# Patient Record
Sex: FEMALE | Race: WHITE | Employment: FULL TIME | URBAN - NONMETROPOLITAN AREA
[De-identification: names, ages, dates, MRNs, and addresses within clinical notes are randomized per-mention and may not be internally consistent; named-entity substitution may affect disease eponyms.]

---

## 2020-01-30 ENCOUNTER — APPOINTMENT (OUTPATIENT)
Dept: GENERAL RADIOLOGY | Age: 55
End: 2020-01-30
Payer: COMMERCIAL

## 2020-01-30 ENCOUNTER — HOSPITAL ENCOUNTER (EMERGENCY)
Age: 55
Discharge: HOME | End: 2020-01-30
Attending: EMERGENCY MEDICINE
Payer: COMMERCIAL

## 2020-01-30 VITALS
RESPIRATION RATE: 18 BRPM | SYSTOLIC BLOOD PRESSURE: 121 MMHG | OXYGEN SATURATION: 96 % | DIASTOLIC BLOOD PRESSURE: 80 MMHG | TEMPERATURE: 98.7 F | HEART RATE: 81 BPM | BODY MASS INDEX: 28.51 KG/M2 | HEIGHT: 61 IN | WEIGHT: 151 LBS

## 2020-01-30 LAB
BILIRUBIN URINE: NEGATIVE
COLOR: YELLOW
COMMENT UA: NORMAL
DIRECT EXAM: NORMAL
DIRECT EXAM: NORMAL
GLUCOSE URINE: NEGATIVE
KETONES, URINE: NEGATIVE
LEUKOCYTE ESTERASE, URINE: NEGATIVE
Lab: NORMAL
NITRITE, URINE: NEGATIVE
PH UA: 6 (ref 5–8)
PROTEIN UA: NEGATIVE
SPECIFIC GRAVITY UA: 1.01 (ref 1–1.03)
SPECIMEN DESCRIPTION: NORMAL
TURBIDITY: CLEAR
URINE HGB: NEGATIVE
UROBILINOGEN, URINE: NORMAL

## 2020-01-30 PROCEDURE — 99283 EMERGENCY DEPT VISIT LOW MDM: CPT

## 2020-01-30 PROCEDURE — 87804 INFLUENZA ASSAY W/OPTIC: CPT

## 2020-01-30 PROCEDURE — 81003 URINALYSIS AUTO W/O SCOPE: CPT

## 2020-01-30 PROCEDURE — 71046 X-RAY EXAM CHEST 2 VIEWS: CPT

## 2020-01-30 RX ORDER — METOPROLOL TARTRATE 100 MG/1
100 TABLET ORAL 3 TIMES DAILY
COMMUNITY
Start: 2019-07-17

## 2020-01-30 RX ORDER — TOCOPHERSOLAN (VITAMIN E TPGS) 400/15ML
1 LIQUID (ML) ORAL DAILY
COMMUNITY

## 2020-01-30 RX ORDER — TRAMADOL HYDROCHLORIDE 50 MG/1
50 TABLET ORAL EVERY 6 HOURS PRN
COMMUNITY
Start: 2018-10-22 | End: 2021-01-04

## 2020-01-30 RX ORDER — ERGOCALCIFEROL 1.25 MG/1
50000 CAPSULE ORAL WEEKLY
COMMUNITY
Start: 2019-11-22

## 2020-01-30 RX ORDER — MILK THISTLE 150 MG
150 CAPSULE ORAL 2 TIMES DAILY
COMMUNITY

## 2020-01-30 RX ORDER — ZOLPIDEM TARTRATE 10 MG/1
10 TABLET ORAL NIGHTLY
COMMUNITY
Start: 2018-10-22 | End: 2020-09-27

## 2020-01-30 RX ORDER — LOSARTAN POTASSIUM 100 MG/1
50 TABLET ORAL 2 TIMES DAILY
COMMUNITY
Start: 2019-11-22

## 2020-01-30 RX ORDER — LEVOFLOXACIN 500 MG/1
500 TABLET, FILM COATED ORAL DAILY
Qty: 7 TABLET | Refills: 0 | Status: SHIPPED | OUTPATIENT
Start: 2020-01-30 | End: 2020-02-06

## 2020-01-30 RX ORDER — CYANOCOBALAMIN (VITAMIN B-12) 1000 MCG
1500 TABLET, SUBLINGUAL SUBLINGUAL DAILY
COMMUNITY

## 2020-01-30 NOTE — LETTER
[facility] ED  [address], [address]  Phone: [phone]               January 30, 2020    Patient: Gerardo Maynard   YOB: 1965   Date of Visit: 1/30/2020       To Whom It May Concern:    Rosemarie Siu was seen and treated in our emergency department on 1/30/2020. She may return to work on 2/1/2020.       Sincerely,       Pj Evans RN         Signature:__________________________________

## 2020-10-21 ENCOUNTER — HOSPITAL ENCOUNTER (OUTPATIENT)
Dept: MRI IMAGING | Age: 55
Discharge: HOME | End: 2020-10-23
Payer: COMMERCIAL

## 2020-10-21 ENCOUNTER — HOSPITAL ENCOUNTER (OUTPATIENT)
Age: 55
Discharge: HOME | End: 2020-10-21
Payer: COMMERCIAL

## 2020-10-21 DIAGNOSIS — R56.9 SEIZURES (HCC): Primary | ICD-10-CM

## 2020-10-21 LAB
BUN BLDV-MCNC: 8 MG/DL (ref 6–20)
CREAT SERPL-MCNC: 0.74 MG/DL (ref 0.5–0.9)
GFR AFRICAN AMERICAN: >60 ML/MIN
GFR NON-AFRICAN AMERICAN: >60 ML/MIN
GFR SERPL CREATININE-BSD FRML MDRD: NORMAL ML/MIN/{1.73_M2}
GFR SERPL CREATININE-BSD FRML MDRD: NORMAL ML/MIN/{1.73_M2}

## 2020-10-21 PROCEDURE — 82565 ASSAY OF CREATININE: CPT

## 2020-10-21 PROCEDURE — 84520 ASSAY OF UREA NITROGEN: CPT

## 2020-10-21 PROCEDURE — A9577 INJ MULTIHANCE: HCPCS | Performed by: PSYCHIATRY & NEUROLOGY

## 2020-10-21 PROCEDURE — 70553 MRI BRAIN STEM W/O & W/DYE: CPT

## 2020-10-21 PROCEDURE — 36415 COLL VENOUS BLD VENIPUNCTURE: CPT

## 2020-10-21 PROCEDURE — 6360000004 HC RX CONTRAST MEDICATION: Performed by: PSYCHIATRY & NEUROLOGY

## 2020-10-21 RX ADMIN — GADOBENATE DIMEGLUMINE 12 ML: 529 INJECTION, SOLUTION INTRAVENOUS at 12:06

## 2020-12-07 ENCOUNTER — TELEPHONE (OUTPATIENT)
Dept: CARDIOLOGY CLINIC | Age: 55
End: 2020-12-07

## 2020-12-10 ENCOUNTER — OFFICE VISIT (OUTPATIENT)
Dept: CARDIOLOGY CLINIC | Age: 55
End: 2020-12-10
Payer: COMMERCIAL

## 2020-12-10 VITALS
BODY MASS INDEX: 27.4 KG/M2 | DIASTOLIC BLOOD PRESSURE: 94 MMHG | SYSTOLIC BLOOD PRESSURE: 160 MMHG | HEART RATE: 68 BPM | OXYGEN SATURATION: 97 % | WEIGHT: 145 LBS

## 2020-12-10 PROCEDURE — G8484 FLU IMMUNIZE NO ADMIN: HCPCS | Performed by: INTERNAL MEDICINE

## 2020-12-10 PROCEDURE — 99204 OFFICE O/P NEW MOD 45 MIN: CPT | Performed by: INTERNAL MEDICINE

## 2020-12-10 PROCEDURE — 3017F COLORECTAL CA SCREEN DOC REV: CPT | Performed by: INTERNAL MEDICINE

## 2020-12-10 PROCEDURE — 1036F TOBACCO NON-USER: CPT | Performed by: INTERNAL MEDICINE

## 2020-12-10 PROCEDURE — G8419 CALC BMI OUT NRM PARAM NOF/U: HCPCS | Performed by: INTERNAL MEDICINE

## 2020-12-10 PROCEDURE — G8427 DOCREV CUR MEDS BY ELIG CLIN: HCPCS | Performed by: INTERNAL MEDICINE

## 2020-12-10 RX ORDER — OXCARBAZEPINE 300 MG/1
300 TABLET, FILM COATED ORAL 2 TIMES DAILY
COMMUNITY

## 2020-12-10 RX ORDER — AMLODIPINE BESYLATE 5 MG/1
5 TABLET ORAL DAILY
COMMUNITY
End: 2020-12-10

## 2020-12-10 RX ORDER — AMLODIPINE BESYLATE 10 MG/1
10 TABLET ORAL DAILY
Qty: 30 TABLET | Refills: 11 | Status: SHIPPED | OUTPATIENT
Start: 2020-12-10

## 2020-12-10 NOTE — PROGRESS NOTES
Ov DR Odette Silva  Seen neurologist   Has had 2 seizures   First OCT 8 and second Nov.  Had funny feeling in her head   And hand tremor, since   On trileptal no more sx. Has had HTN since this   Summer. Been on lopressor for years. For tachycardia. Had ablasion for PVC   Done in [place]. 15-18 years ago. No chest pain or sob. Just started on Norvasc   2 days ago   C/o being fatigued with   Meds. 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 Pt is [employer]. Daughter. bp at work 160/104  This morning 136/77  On Norvasc.  with no   Children   Has dog named Larissa. Pt is scheduled for Echo   On 12/16. Bedside echo done. Increase norvasc to 10mg   Daily. Pt was told has sleep apnea  But does not use cpap. Call in 1 week with bp. If uncontrolled will start   Cardura.

## 2020-12-14 NOTE — PROGRESS NOTES
Queen Ramandeep M.D. [address] [place] [address] [address]  [phone]        December 10, 2020        Rogelio Aguirre, CNP  [address] [place] [address]    RE:   Abisai Artist  :  1965    Dear Priya Sutherlander:    CHIEF COMPLAINT:  1. Hypertension, uncontrolled. 2.  History of 2 seizures. HISTORY OF PRESENT ILLNESS:  I had the pleasure of seeing Ms. Siu in the office on 12/10/2020. She is a pleasant 54-year-old female, who has a history of ablation for PVCs approximately 15 to 18 years ago in [facility]. At that time, she was very symptomatic with any PVCs and it markedly helped. She has been on Lopressor for many years for tachycardia. She was doing well. However, in the summer, her blood pressure began elevating. It had been stable up until the summer. She went in for a COVID test, and at that time, she had a markedly elevated blood pressure. She has had the addition of Cozaar, which is now at a maximum dose of 50 mg b.i.d. Her Lopressor is up to 100 mg 3 times a day and Norvasc 5 mg daily was just added 2 days ago. On 10/08, she had a seizure. She was up at [facility], closing her cabin, when she had a witnessed seizure. She had a second seizure in November. She initially saw Dr. Xochitl Rojas but recently has started seeing Alix Duenas in  [place]. She was placed on Keppra and referred to neurology. She had an MRI, which was negative, and an EEG, which was also negative. She was then taken off Keppra. While at work at [employer] on 2020, she had a \"weird head sensation\" and had another seizure. She was taken to the emergency room. She then saw Dr. Jaxon Cheung, who placed her on Trileptal 300 mg, with 300 mg in the morning and 450 mg in the evening. She saw Rogelio Aguirre, who placed her on Norvasc 5 mg daily. She denies any history of chest pain or chest discomfort. She has had no unusual shortness of breath. She has had marked fatigue, starting in the summer. She is an RN, who works at [employer], who has been there for 22 years. She enjoys to work but it is fairly stressful, especially now with the [employer]. On Norvasc this morning at work, her blood pressure was 136/77. It previously had been in the 160 to 180/100 to 110 range. Of note, she does have a history of sleep apnea but has not worn a CPAP mask for many years. She denies any PND or orthopnea. No unusual pedal edema. She has marked insomnia, which may be related to sleep apnea. She has had no fevers, sweats, or chills. No unusual hot flashes or face flushing. She has had no change in her stool, such as diarrhea. CARDIAC RISK FACTORS:  Known CAD:  Negative. Hypertension:  Positive. Hyperlipidemia:  Unknown. Peripheral Vascular Disease:  Negative. Smoking:  Negative. Diabetes:  Negative. Other Family Members:  Mildly positive. MEDICATION AT THIS TIME:  She is on Norvasc 5 mg daily, vitamin B12 1000 mcg daily, Cozaar 50 mg b.i.d., Lopressor 100 mg t.i.d., Trileptal 300 mg one in the morning and one and a half in the evening, Ultram p.r.n., vitamin D 50,000 units weekly. PAST MEDICAL AND SURGICAL HISTORY:  1. She had 2 seizures, as stated previously. 2.  She has had PVCs with ablation 15 to 18 years ago. 3.  She has a long history of hypertension, which has been controlled but markedly elevated in September or October. 4.  She had hysterectomy. 5.  Breast cancer history. 6.  Gastric bypass 12 years ago. 7.  Cholecystectomy. 8.  Appendectomy. FAMILY HISTORY:  Grandfather had coronary artery disease. SOCIAL HISTORY:  She is 63years old,  from her . Lives by herself with a Radha Golden Alex dog, named Cristel Mckinnon. Does not drink alcohol. Does not smoke. REVIEW OF SYSTEMS:  Cardiac as above. Other systems reviewed including constitutional, eyes, ears, nose and throat, cardiovascular, respiratory, GI, , musculoskeletal, integumentary, neurologic, endocrine, hematologic and allergic/immunologic are negative except for what is described above. No weight loss or weight gain. No change in bowel habits, no blood in stools. No fevers, sweats or chills. She does have a large snoring history and have been told that she had sleep apnea years ago but does not wear a CPAP mask. She does have marked insomnia. PHYSICAL EXAMINATION:  VITAL SIGNS:  Her blood pressure was 160/94 in both arms, with a heart rate of 68 and regular. Respiratory rate 18. O2 sat 97%. Weight 145 pounds. GENERAL:  She is a pleasant 67-year-old female. Denied pain. She was oriented to person, place and time. Answered questions appropriately. SKIN:  No unusual skin changes. HEENT:  The pupils are equally round and intact. Mucous membranes were dry. NECK:  No JVD. Good carotid pulses. No carotid bruits. No lymphadenopathy or thyromegaly. CARDIOVASCULAR EXAM:  S1 and S2 were normal.  No S3 or S4. Soft systolic blowing type murmur. No diastolic murmur. PMI was normal.  No lift, thrust, or pericardial friction rub. LUNGS:  Quite clear to auscultation and percussion. ABDOMEN:  Soft and nontender. Good bowel sounds. EXTREMITIES:  Good femoral pulses. Good pedal pulses. No pedal edema. Skin was warm and dry. No calf tenderness. Nail beds pink. Good cap refill. PULSES:  Bilateral symmetrical radial, brachial and carotid pulses. No carotid bruits. Good femoral and pedal pulses. NEUROLOGIC EXAM:  Within normal limits. PSYCHIATRIC EXAM:  Within normal limits. LABORATORY DATA:  Her glucose was 95, BUN 11, creatinine 0.92, sodium was 140, potassium 4.5, calcium was 9.9. AST was 31, ALT was 25. White count 5.5, hemoglobin 15.5 with a platelet count of 360,770. Vitamin D level is low at 21. SHAHID was negative. TSH 1.25. Sed rate was 6.  C-reactive protein less than 1. Her lipid profile has not been drawn as of yet. EKG showed normal sinus rhythm, was normal.    MRI was normal.    Full echocardiogram is pending but her bedside echocardiogram showed normal LV function. I had difficulty seeing right-sided chambers. IMPRESSION:  1. Hypertension accelerated in approximately 09/2020 or 10/2020, still uncontrolled with high-dose Lopressor, full-dose Cozaar and 5 mg of Norvasc. 2.  Seizure starting on 10/08, with a second seizure on 11/02/2020, when she was at work at [employer], with no further seizures since being on Trileptal.  3.  History of sleep apnea, with her not using CPAP mask. 4.  History of PVCs, with ablation for PVCs 15 to 18 years ago at [place], most likely at [facility]. 5.  Marked fatigue with insomnia, which may be secondary to her sleep apnea. PLAN:  1. Increase Norvasc to 10 mg daily. 2.  Highly recommended a sleep study as her sleep apnea may be exacerbating her hypertension as well as her fatigue. 3.  Increase Norvasc to 10 mg daily. 4.  She will call us with her blood pressures in 1 week, and if her pressure is still elevated, we will start Cardura 2 mg daily and titrate to control pressure. 5.  If she is uncontrolled with her pressure with Lopressor, Cozaar, Norvasc and Cardura, we would then do an MRA of her kidneys as well as urine for ruling out pheochromocytoma and hyperaldosteronism although at this point her potassium is normal.  6.  We will see her at the end of January to reevaluate and do a lipid profile at that time also. 7.  If blood pressure uncontrolled with Norvasc, would consider adding Aldactone 25 mg daily in addition to Cardura. DISCUSSION:  Ms. Kvng Jewell has had a marked increase in her blood pressure, starting about the time of her seizures. It is possible that her seizure activity was exacerbated by her hypertension although it would be somewhat unusual.  I am also not sure why her blood pressure began elevating in September and October when it had been under fairly normal range with Lopressor alone given for tachycardia after her PVCs were ablated. I do not see any evidence thus far of this being secondary to hypertension. Her potassium is normal and she has no symptoms to indicate that she has pheochromocytoma. I did not do renin studies as of yet or urine for catecholamines and metanephrines. If, however, she is uncontrolled with Norvasc, I would consider adding Aldactone empirically along with Cardura. She has no history of chest pain or chest discomfort or any unusual shortness of breath to indicate that she is having any anginal-type symptoms. I will not do a stress test at this time. An echocardiogram is pending and we will look at her LV function as well as her right ventricular size and function. Hopefully, we will be able to get an idea of her pulmonary pressures, which I suspect could be mildly elevated with her history of sleep apnea. Again, I did talk to her about the sleep study and I believe it is important for her to have this done in the future. I will have Mauricio Sarmiento and Dr. Saeid Mederos also talk to her about this and hopefully she will agree to do another sleep study and treat it if it is significant. We will try to work with her blood pressure over the phone and hopefully by the time I see her in January, her pressure would be under good control.

## 2020-12-16 ENCOUNTER — HOSPITAL ENCOUNTER (OUTPATIENT)
Dept: NON INVASIVE DIAGNOSTICS | Age: 55
Discharge: HOME | End: 2020-12-16
Payer: COMMERCIAL

## 2020-12-17 ENCOUNTER — HOSPITAL ENCOUNTER (OUTPATIENT)
Dept: NON INVASIVE DIAGNOSTICS | Age: 55
Discharge: HOME | End: 2020-12-17
Payer: COMMERCIAL

## 2020-12-17 LAB
LV EF: 60 %
LVEF MODALITY: NORMAL

## 2020-12-17 PROCEDURE — 93306 TTE W/DOPPLER COMPLETE: CPT

## 2023-01-08 ENCOUNTER — APPOINTMENT (OUTPATIENT)
Dept: GENERAL RADIOLOGY | Age: 58
End: 2023-01-08
Payer: COMMERCIAL

## 2023-01-08 ENCOUNTER — HOSPITAL ENCOUNTER (EMERGENCY)
Age: 58
Discharge: HOME | End: 2023-01-09
Attending: FAMILY MEDICINE
Payer: COMMERCIAL

## 2023-01-08 DIAGNOSIS — Z86.79 HISTORY OF HYPERTENSION: ICD-10-CM

## 2023-01-08 DIAGNOSIS — I48.0 INTERMITTENT ATRIAL FIBRILLATION (HCC): Primary | ICD-10-CM

## 2023-01-08 DIAGNOSIS — E87.1 HYPONATREMIA: ICD-10-CM

## 2023-01-08 LAB
ABSOLUTE EOS #: 0 K/UL (ref 0–0.4)
ABSOLUTE LYMPH #: 1.9 K/UL (ref 1–4.8)
ABSOLUTE MONO #: 0.3 K/UL (ref 0–1)
ANION GAP SERPL CALCULATED.3IONS-SCNC: 14 MMOL/L (ref 9–17)
BASOPHILS # BLD: 1 % (ref 0–2)
BASOPHILS ABSOLUTE: 0 K/UL (ref 0–0.2)
BUN BLDV-MCNC: 9 MG/DL (ref 6–20)
BUN/CREAT BLD: 18 (ref 9–20)
CALCIUM SERPL-MCNC: 9.1 MG/DL (ref 8.6–10.4)
CHLORIDE BLD-SCNC: 89 MMOL/L (ref 98–107)
CO2: 24 MMOL/L (ref 20–31)
CREAT SERPL-MCNC: 0.51 MG/DL (ref 0.5–0.9)
D-DIMER QUANTITATIVE: 0.53 MG/L FEU (ref 0–0.59)
DIFFERENTIAL TYPE: YES
EOSINOPHILS RELATIVE PERCENT: 1 % (ref 0–5)
GFR SERPL CREATININE-BSD FRML MDRD: >60 ML/MIN/1.73M2
GLUCOSE BLD-MCNC: 110 MG/DL (ref 70–99)
HCT VFR BLD CALC: 46.5 % (ref 36–46)
HEMOGLOBIN: 15.4 G/DL (ref 12–16)
LYMPHOCYTES # BLD: 40 % (ref 15–40)
MCH RBC QN AUTO: 30.2 PG (ref 26–34)
MCHC RBC AUTO-ENTMCNC: 33.2 G/DL (ref 31–37)
MCV RBC AUTO: 91.1 FL (ref 80–100)
MONOCYTES # BLD: 5 % (ref 4–8)
PDW BLD-RTO: 15.1 % (ref 12.1–15.2)
PLATELET # BLD: 201 K/UL (ref 140–450)
POTASSIUM SERPL-SCNC: 3.7 MMOL/L (ref 3.7–5.3)
RBC # BLD: 5.11 M/UL (ref 4–5.2)
SEG NEUTROPHILS: 53 % (ref 47–75)
SEGMENTED NEUTROPHILS ABSOLUTE COUNT: 2.5 K/UL (ref 2.5–7)
SODIUM BLD-SCNC: 127 MMOL/L (ref 135–144)
TROPONIN, HIGH SENSITIVITY: 7 NG/L (ref 0–14)
TSH SERPL DL<=0.05 MIU/L-ACNC: 2.07 UIU/ML (ref 0.3–5)
WBC # BLD: 4.7 K/UL (ref 3.5–11)

## 2023-01-08 PROCEDURE — 99285 EMERGENCY DEPT VISIT HI MDM: CPT

## 2023-01-08 PROCEDURE — 80048 BASIC METABOLIC PNL TOTAL CA: CPT

## 2023-01-08 PROCEDURE — 85379 FIBRIN DEGRADATION QUANT: CPT

## 2023-01-08 PROCEDURE — 84484 ASSAY OF TROPONIN QUANT: CPT

## 2023-01-08 PROCEDURE — 85025 COMPLETE CBC W/AUTO DIFF WBC: CPT

## 2023-01-08 PROCEDURE — 93005 ELECTROCARDIOGRAM TRACING: CPT | Performed by: FAMILY MEDICINE

## 2023-01-08 PROCEDURE — 71045 X-RAY EXAM CHEST 1 VIEW: CPT

## 2023-01-08 PROCEDURE — 84443 ASSAY THYROID STIM HORMONE: CPT

## 2023-01-08 RX ORDER — TIZANIDINE 4 MG/1
4 TABLET ORAL NIGHTLY
COMMUNITY

## 2023-01-08 RX ORDER — ENALAPRILAT 2.5 MG/2ML
0.62 INJECTION INTRAVENOUS ONCE
Status: DISCONTINUED | OUTPATIENT
Start: 2023-01-08 | End: 2023-01-08

## 2023-01-08 RX ORDER — HYDRALAZINE HYDROCHLORIDE 20 MG/ML
5 INJECTION INTRAMUSCULAR; INTRAVENOUS ONCE
Status: DISCONTINUED | OUTPATIENT
Start: 2023-01-08 | End: 2023-01-09 | Stop reason: HOSPADM

## 2023-01-08 RX ORDER — ZOLPIDEM TARTRATE 10 MG/1
10 TABLET ORAL NIGHTLY PRN
COMMUNITY

## 2023-01-08 ASSESSMENT — PAIN - FUNCTIONAL ASSESSMENT: PAIN_FUNCTIONAL_ASSESSMENT: NONE - DENIES PAIN

## 2023-01-09 ENCOUNTER — HOSPITAL ENCOUNTER (OUTPATIENT)
Age: 58
Setting detail: SPECIMEN
Discharge: HOME | End: 2023-01-09

## 2023-01-09 VITALS
WEIGHT: 123 LBS | DIASTOLIC BLOOD PRESSURE: 109 MMHG | BODY MASS INDEX: 24.15 KG/M2 | RESPIRATION RATE: 15 BRPM | SYSTOLIC BLOOD PRESSURE: 165 MMHG | TEMPERATURE: 97.1 F | HEIGHT: 60 IN | OXYGEN SATURATION: 97 % | HEART RATE: 72 BPM

## 2023-01-09 DIAGNOSIS — Z13.220 ENCOUNTER FOR LIPID SCREENING FOR CARDIOVASCULAR DISEASE: ICD-10-CM

## 2023-01-09 DIAGNOSIS — Z13.6 ENCOUNTER FOR LIPID SCREENING FOR CARDIOVASCULAR DISEASE: ICD-10-CM

## 2023-01-09 DIAGNOSIS — I48.91 ATRIAL FIBRILLATION, UNSPECIFIED TYPE (HCC): Primary | ICD-10-CM

## 2023-01-09 DIAGNOSIS — I10 HYPERTENSION, UNSPECIFIED TYPE: ICD-10-CM

## 2023-01-09 DIAGNOSIS — I48.91 ATRIAL FIBRILLATION, UNSPECIFIED TYPE (HCC): ICD-10-CM

## 2023-01-09 LAB
ALBUMIN SERPL-MCNC: 5 G/DL (ref 3.5–5.2)
ALP BLD-CCNC: 154 U/L (ref 35–104)
ALT SERPL-CCNC: 82 U/L (ref 5–33)
ANION GAP SERPL CALCULATED.3IONS-SCNC: 18 MMOL/L (ref 9–17)
AST SERPL-CCNC: 187 U/L
BILIRUB SERPL-MCNC: 0.4 MG/DL (ref 0.3–1.2)
BUN BLDV-MCNC: 9 MG/DL (ref 6–20)
BUN/CREAT BLD: 18 (ref 9–20)
CALCIUM SERPL-MCNC: 9.2 MG/DL (ref 8.6–10.4)
CHLORIDE BLD-SCNC: 89 MMOL/L (ref 98–107)
CO2: 21 MMOL/L (ref 20–31)
CREAT SERPL-MCNC: 0.5 MG/DL (ref 0.5–0.9)
EKG Q-T INTERVAL: 292 MS
EKG QRS DURATION: 88 MS
EKG QTC CALCULATION (BAZETT): 387 MS
EKG R AXIS: 12 DEGREES
EKG T AXIS: 22 DEGREES
EKG VENTRICULAR RATE: 106 BPM
GFR SERPL CREATININE-BSD FRML MDRD: >60 ML/MIN/1.73M2
GLUCOSE BLD-MCNC: 108 MG/DL (ref 70–99)
MAGNESIUM: 1.8 MG/DL (ref 1.6–2.6)
POTASSIUM SERPL-SCNC: 3.9 MMOL/L (ref 3.7–5.3)
SODIUM BLD-SCNC: 128 MMOL/L (ref 135–144)
TOTAL PROTEIN: 8.2 G/DL (ref 6.4–8.3)

## 2023-01-09 PROCEDURE — 93010 ELECTROCARDIOGRAM REPORT: CPT | Performed by: INTERNAL MEDICINE

## 2023-01-09 ASSESSMENT — ENCOUNTER SYMPTOMS: COUGH: 1

## 2023-01-09 NOTE — ED PROVIDER NOTES
[facility]  eMERGENCY dEPARTMENT eNCOUnter          [address]       Chief Complaint   Patient presents with    Palpitations     Patient arrives to ER tonight with reports of palpitations that began tonight. Patient reports Yeni Rdz seen sick the last few days and I wouldn't be surprised if I have pneumonia so I have been on levaquin\". Nurses Notes reviewed and I agree except as noted in the HPI. HISTORY OF PRESENT ILLNESS    Rosemarie Siu is a 61 y.o. female who presents to the emergency room via private vehicle with friend, patient reports that she began having palpitations proxy 1 hour prior to arrival, states she feels her heart is \"flipping about\". 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 Patient recently started herself on Levaquin as she states \"pretty sure with my lungs that I have pneumonia\", patient states that Levaquin \"only thing that works for [facility]". Patient denies any excessive caffeine or other stimulants, denies any drug use such as cocaine or methamphetamines, this that she is had a cough with occasional production, little short of breath. Patient has history of hypertension and they have had difficulty controlling her pressure, is currently seeing nephrologist who is working with her on this, as well as her low sodium though this is felt to be due to her Trileptal medication for seizures. PCP: KARISSA Mendes  Neph: Dr. Peggy Reeves  Neuro: ?? REVIEW OF SYSTEMS     Review of Systems   Constitutional:  Negative for fever. Respiratory:  Positive for cough. Cardiovascular:  Positive for palpitations. All other systems reviewed and are negative. PAST MEDICAL HISTORY    has a past medical history of Hypertension. SURGICAL HISTORY      has a past surgical history that includes hernia repair; Appendectomy;  Cholecystectomy; Gastric bypass surgery; and Hysterectomy, total abdominal.    CURRENT MEDICATIONS       Discharge Medication List as of 1/9/2023 12:06 AM        CONTINUE these medications which have NOT CHANGED    Details   zolpidem (AMBIEN) 10 MG tablet Take 10 mg by mouth nightly as needed for Sleep. Historical Med      tiZANidine (ZANAFLEX) 4 MG tablet Take 4 mg by mouth at bedtimeHistorical Med      OXcarbazepine (TRILEPTAL) 300 MG tablet Take 300 mg by mouth 2 times daily 300mg in am and 450mg at night. Historical Med      amLODIPine (NORVASC) 10 MG tablet Take 1 tablet by mouth daily, Disp-30 tablet,R-11Normal      Calcium-Magnesium-Vitamin D 500-250-200 MG-MG-UNIT TABS Take 1 tablet by mouth dailyHistorical Med      metoprolol (LOPRESSOR) 100 MG tablet Take 100 mg by mouth 3 times dailyHistorical Med      losartan (COZAAR) 100 MG tablet Take 50 mg by mouth 2 times daily Historical Med      vitamin D (ERGOCALCIFEROL) 1.25 MG (70896 UT) CAPS capsule Take 50,000 Units by mouth once a weekHistorical Med      Cyanocobalamin (VITAMIN B-12) 500 MCG SUBL Place 1,500 mcg under the tongue dailyHistorical Med      Milk Thistle 150 MG CAPS Take 150 mg by mouth 2 times dailyHistorical Med             ALLERGIES     is allergic to ciprofloxacin, hydromorphone, hydromorphone hcl, oxycodone-acetaminophen, penicillins, and sulfa antibiotics. FAMILY HISTORY     has no family status information on file. family history is not on file. SOCIAL HISTORY      reports that she has never smoked. She has never used smokeless tobacco. She reports that she does not currently use alcohol. She reports that she does not currently use drugs. PHYSICAL EXAM     INITIAL VITALS:  height is 5' (1.524 m) and weight is 123 lb (55.8 kg). Her temporal temperature is 97.1 °F (36.2 °C). Her blood pressure is 165/109 (abnormal) and her pulse is 72. Her respiration is 15 and oxygen saturation is 97%. Physical Exam   Constitutional: Patient is oriented to person, place, and time.  Patient appears well-developed and well-nourished. Patient is active and cooperative. HENT:   Head: Normocephalic and atraumatic. Head is without contusion. Right Ear: Hearing and external ear normal. No drainage. Left Ear: Hearing and external ear normal. No drainage. Nose: Nose normal. No nasal deformity. No epistaxis. Mouth/Throat: Mucous membranes are not dry. Eyes: EOMI. Conjunctivae, sclera, and lids are normal. Right eye exhibits no discharge. Left eye exhibits no discharge. Neck: Full passive range of motion without pain and phonation normal.  Negative JVD, negative tracheal deviation  Cardiovascular: Increased rate ~105-125, irregular rhythm and intact distal pulses. No gross lower extremity edema. Pulses: Right radial pulse  2+   Pulmonary/Chest: Effort normal. No tachypnea and no bradypnea. No wheezes, rhonchi, or rales. Abdominal: BMI 24.0, soft. Patient without distension or tenderness  Musculoskeletal:   Negative acute trauma or deformity,  apparent full range of motion and normal strength all extremities appropriate to age. Neurological: Patient is alert and oriented to person, place, and time. patient displays no tremor. Patient displays no seizure activity. .    Skin: Skin is warm and dry. Patient is not diaphoretic. Psychiatric: Patient has a normal mood and affect. Patient speech is normal and behavior is normal. Cognition and memory are normal.     DIFFERENTIAL DIAGNOSIS:   New onset atrial fibrillation, dysrhythmia NOS, anxiety, pneumonia bronchitis URI    DIAGNOSTIC RESULTS     EKG: All EKG's are interpreted by the Emergency Department Physician who either signs or Co-signs this chart in the absence of a cardiologist.  EKG    The patient had an EKG which is interpreted by me in the absence of a Cardiologist.   [x] Without comparison to previous.    [] With comparison to a previous EKG Dated     EKG @ 2256 hrs -AF RVR, rate 106, normal axis normal QRS QTC intervals, no prior for comparison      RADIOLOGY: non-plain film images(s) such as CT, Ultrasound and MRI are read by the radiologist.  XR CHEST PORTABLE   Final Result      1. Lungs appear hyperinflated suggestive of obstructive lung disease such as    bronchitis. 2. No focal consolidation. LABS:   Labs Reviewed   BASIC METABOLIC PANEL - Abnormal; Notable for the following components:       Result Value    Glucose 110 (*)     Sodium 127 (*)     Chloride 89 (*)     All other components within normal limits   CBC WITH AUTO DIFFERENTIAL - Abnormal; Notable for the following components:    Hematocrit 46.5 (*)     All other components within normal limits   TROPONIN   TSH WITH REFLEX   D-DIMER, QUANTITATIVE       MIPS    Not applicable    MEDICAL DECISION MAKING   Vitals:    Vitals:    01/08/23 2347 01/08/23 2354 01/09/23 0000 01/09/23 0015   BP: (!) 154/105 (!) 154/105 (!) 168/98 (!) 165/109   Pulse: 79  75 72   Resp: 17  17 15   Temp:       TempSrc:       SpO2: 96%  96% 97%   Weight:       Height:         Patient history and physical exam taken at bedside, discussed patient symptoms and exam findings, discussed initial work-up to good EKG, chest x-ray, IV access, blood work, place patient on cardiac monitor, pulse ox, sitting semi-Prescott's in bed with friend at bedside. 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 Chest x-ray reviewed, no gross consolidation effusion or pneumothorax, there is some central thickening consistent with likely bronchitis. Initial labs reviewed, WBC 4.7 with normal differential, H&H 15.4/46.5, normal kidney function electrolytes save sodium 127, hsTnT 7, DDimer 0.53    During cardiac monitor, patient seems to spontaneously reverted to a sinus rhythm, rate 70s, with blood pressure still elevated coming back down to more normal levels, currently systolic 001V, initially now.   Though later determined patient cannot take ACE inhibitor's, changed to hydralazine, was held given patient's blood pressure has been coming down at this time. Updated patient on chest x-ray findings, explained that she does not have any obvious pneumonia        Discussed the patient her initial work-up, noting chest x-ray not showing obvious pneumonia with normal white blood cell count differential low probability of pneumonia, given patient's timeline I would strongly advise her to stop the Levaquin at this time. Noting her low sodium which seems chronic for her, I suspect this is due to her Trileptal medication for seizures, and patient can continue to work her nephrologist/neurologist groups regarding this. Discussed patient with new onset atrial fibrillation that has spontaneously converted, there was some discussion regarding increased risk for stroke given her atrial fibrillation, the patient seems very reluctant to start blood thinners at this time. All patient is on a very large amount of metoprolol, patient states she is been on this for many years, would not make any changes as this would be her rate control medication, I will discussed the case with cardiology as she has seen Dr. Kirill Flores in the past, and may need follow-up same. Given the discussion regarding blood thinners, as patient is reluctant to start anything, hold on it at this time. We discussed signs symptoms to watch for, reasons return the emergency room, close outpatient follow-up, patient is agreeable. FINAL IMPRESSION      1. Intermittent atrial fibrillation (HCC)    2. History of hypertension    3.  Hyponatremia          DISPOSITION/PLAN   Discharge    PATIENT REFERRED TO:  Alison Bazan MD  [place] [id number] [phone]    Call       Follow up with established Nephrology    Call       ISSAC Aguilar CNP  [address] [facility] [phone]    Call   As needed    [facility] ED  [address] [facility] [address] [facility] [address]  [phone]    If symptoms worsen, As needed      DISCHARGE MEDICATIONS:  Discharge Medication List as of 1/9/2023 12:06 AM              [facility]          ED Medications administered this visit:    Medications   hydrALAZINE (APRESOLINE) injection 5 mg (0 mg IntraVENous Held 1/8/23 8182)       New Prescriptions from this visit:    Discharge Medication List as of 1/9/2023 12:06 AM          Follow-up:  Tima Pollack MD  [address] [place] [address]  [phone]    Call       Follow up with established Nephrology    Call       ISSAC Smith - CNP  [address] [facility] [phone]    Call   As needed    [facility] ED  [address] [facility] [address] [phone]    If symptoms worsen, As needed        Final Impression:   1. Intermittent atrial fibrillation (HCC)    2. History of hypertension    3.  Hyponatremia               (Please note that portions of this note were completed with a voice recognition program.  Efforts were made to edit the dictations but occasionally words are mis-transcribed.)    MD Tonny Bonilla MD  01/09/23 7672

## 2023-01-10 DIAGNOSIS — I48.91 ATRIAL FIBRILLATION, UNSPECIFIED TYPE (HCC): Primary | ICD-10-CM

## 2023-01-10 DIAGNOSIS — I10 HYPERTENSION, UNSPECIFIED TYPE: ICD-10-CM

## 2023-01-10 LAB
CHOLESTEROL/HDL RATIO: 2.3
CHOLESTEROL: 230 MG/DL
HDLC SERPL-MCNC: 101 MG/DL
LDL CHOLESTEROL: 79 MG/DL (ref 0–130)
TRIGL SERPL-MCNC: 251 MG/DL

## 2023-01-11 ENCOUNTER — HOSPITAL ENCOUNTER (OUTPATIENT)
Age: 58
Discharge: HOME | End: 2023-01-11
Payer: COMMERCIAL

## 2023-01-11 ENCOUNTER — HOSPITAL ENCOUNTER (OUTPATIENT)
Dept: NON INVASIVE DIAGNOSTICS | Age: 58
Discharge: HOME | End: 2023-01-11
Payer: COMMERCIAL

## 2023-01-11 ENCOUNTER — OFFICE VISIT (OUTPATIENT)
Dept: CARDIOLOGY CLINIC | Age: 58
End: 2023-01-11
Payer: COMMERCIAL

## 2023-01-11 VITALS
HEART RATE: 89 BPM | BODY MASS INDEX: 24.41 KG/M2 | OXYGEN SATURATION: 95 % | SYSTOLIC BLOOD PRESSURE: 240 MMHG | DIASTOLIC BLOOD PRESSURE: 120 MMHG | WEIGHT: 125 LBS

## 2023-01-11 DIAGNOSIS — I48.91 ATRIAL FIBRILLATION, UNSPECIFIED TYPE (HCC): ICD-10-CM

## 2023-01-11 DIAGNOSIS — I10 HYPERTENSION, UNSPECIFIED TYPE: ICD-10-CM

## 2023-01-11 DIAGNOSIS — R06.02 SOB (SHORTNESS OF BREATH): Primary | ICD-10-CM

## 2023-01-11 DIAGNOSIS — R06.02 SOB (SHORTNESS OF BREATH): ICD-10-CM

## 2023-01-11 LAB
ALBUMIN SERPL-MCNC: 4.6 G/DL (ref 3.5–5.2)
ALP BLD-CCNC: 116 U/L (ref 35–104)
ALT SERPL-CCNC: 47 U/L (ref 5–33)
ANION GAP SERPL CALCULATED.3IONS-SCNC: 9 MMOL/L (ref 9–17)
AST SERPL-CCNC: 36 U/L
BILIRUB SERPL-MCNC: 0.3 MG/DL (ref 0.3–1.2)
BUN BLDV-MCNC: 5 MG/DL (ref 6–20)
BUN/CREAT BLD: 7 (ref 9–20)
CALCIUM SERPL-MCNC: 9.2 MG/DL (ref 8.6–10.4)
CHLORIDE BLD-SCNC: 102 MMOL/L (ref 98–107)
CO2: 27 MMOL/L (ref 20–31)
CREAT SERPL-MCNC: 0.67 MG/DL (ref 0.5–0.9)
EKG ATRIAL RATE: 57 BPM
EKG P AXIS: 67 DEGREES
EKG P-R INTERVAL: 154 MS
EKG Q-T INTERVAL: 404 MS
EKG QRS DURATION: 80 MS
EKG QTC CALCULATION (BAZETT): 393 MS
EKG R AXIS: 69 DEGREES
EKG T AXIS: 69 DEGREES
EKG VENTRICULAR RATE: 57 BPM
GFR SERPL CREATININE-BSD FRML MDRD: >60 ML/MIN/1.73M2
GLUCOSE BLD-MCNC: 108 MG/DL (ref 70–99)
POTASSIUM SERPL-SCNC: 4.1 MMOL/L (ref 3.7–5.3)
SODIUM BLD-SCNC: 138 MMOL/L (ref 135–144)
TOTAL PROTEIN: 7.1 G/DL (ref 6.4–8.3)

## 2023-01-11 PROCEDURE — 93005 ELECTROCARDIOGRAM TRACING: CPT

## 2023-01-11 PROCEDURE — 36415 COLL VENOUS BLD VENIPUNCTURE: CPT

## 2023-01-11 PROCEDURE — 3074F SYST BP LT 130 MM HG: CPT | Performed by: INTERNAL MEDICINE

## 2023-01-11 PROCEDURE — 93270 REMOTE 30 DAY ECG REV/REPORT: CPT

## 2023-01-11 PROCEDURE — 1036F TOBACCO NON-USER: CPT | Performed by: INTERNAL MEDICINE

## 2023-01-11 PROCEDURE — 99214 OFFICE O/P EST MOD 30 MIN: CPT | Performed by: INTERNAL MEDICINE

## 2023-01-11 PROCEDURE — G8427 DOCREV CUR MEDS BY ELIG CLIN: HCPCS | Performed by: INTERNAL MEDICINE

## 2023-01-11 PROCEDURE — G8484 FLU IMMUNIZE NO ADMIN: HCPCS | Performed by: INTERNAL MEDICINE

## 2023-01-11 PROCEDURE — 3078F DIAST BP <80 MM HG: CPT | Performed by: INTERNAL MEDICINE

## 2023-01-11 PROCEDURE — 3017F COLORECTAL CA SCREEN DOC REV: CPT | Performed by: INTERNAL MEDICINE

## 2023-01-11 PROCEDURE — G8420 CALC BMI NORM PARAMETERS: HCPCS | Performed by: INTERNAL MEDICINE

## 2023-01-11 PROCEDURE — 80053 COMPREHEN METABOLIC PANEL: CPT

## 2023-01-11 PROCEDURE — 93010 ELECTROCARDIOGRAM REPORT: CPT | Performed by: INTERNAL MEDICINE

## 2023-01-11 RX ORDER — CHLORTHALIDONE 25 MG/1
25 TABLET ORAL DAILY
COMMUNITY
End: 2023-01-11 | Stop reason: SDUPTHER

## 2023-01-11 RX ORDER — DOXAZOSIN 2 MG/1
2 TABLET ORAL NIGHTLY
COMMUNITY
End: 2023-01-11 | Stop reason: SDUPTHER

## 2023-01-11 RX ORDER — DOXAZOSIN 2 MG/1
2 TABLET ORAL NIGHTLY
Qty: 30 TABLET | Refills: 11 | Status: SHIPPED | OUTPATIENT
Start: 2023-01-11

## 2023-01-11 RX ORDER — CHLORTHALIDONE 25 MG/1
25 TABLET ORAL DAILY
Qty: 30 TABLET | Refills: 11 | Status: SHIPPED | OUTPATIENT
Start: 2023-01-11

## 2023-01-11 NOTE — PROGRESS NOTES
Ov Dr. 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  Working with 4-5 nurses   But should be 8-9   No children   Had been  x 7 years  Had to call police on    and was put in correction for weekend  (7 yrs ago)  Has not seen him since and  Had been good until   Recently he broke hip and   Had to move back in with her -  Had nobody to help him   And she was took care of him   He is back home now   Mom and grandmother  Have a fib   Dad passed from a stroke   Quit smoking 18 yrs ago   (+) alcohol         Will ADD Cardura 2 mg one nightly     Will ADD Hygroton 25 mg one daily     Will get BMP in one week     Monitor BP/HR daily - different times of the day -  Report readings back to us in 2 weeks     Will get up for  30 day event monitor/echo    Will ADD Eliquis 5 mg one tablet twice day   (Samples given)    Follow up in 6 weeks     Check prices of anticoagulation medications  With your insurance   Xarelto 20 mg (daily)  Eliquis 5mg (bid)  Pradaxa 75 mg (daily)  Coumadin (as directed)

## 2023-01-11 NOTE — PATIENT INSTRUCTIONS
Will ADD Cardura 2 mg one nightly     Will ADD Hygroton 25 mg one daily     Will get BMP in one week     Monitor BP/HR daily - different times of the day -  Report readings back to us in 2 weeks     Will get up for  30 day event monitor/echo    Will ADD Eliquis 5 mg one tablet twice a day    (Samples given)    Follow up in 6 weeks     Check prices of anticoagulation medications  With your insurance   Xarelto 20 mg (daily)  Eliquis 5mg (bid)  Pradaxa 75 mg (daily)  Coumadin (as directed)

## 2023-01-23 ENCOUNTER — TELEPHONE (OUTPATIENT)
Dept: CARDIOLOGY CLINIC | Age: 58
End: 2023-01-23

## 2023-01-25 NOTE — PROGRESS NOTES
Reuben Morfin M.D.  [facility]  [facility]  [address] [place] [address]  [phone]          2023          Ruth Mendes, APRN-CNP  [address] [place] [address]      RE:   Rosemarie Siu  :  1965      Dear Ruth:    CHIEF COMPLAINT:  1.  Atrial fibrillation on 2023, which converted to sinus rhythm.  2.  History of palpitations for the last two weeks.  3.  Loss of energy.  4.  History of hyponatremia, which appears to be alcohol induced.    HISTORY OF PRESENT ILLNESS:  I had the pleasure of seeing Rosemarie Siu in our office on 2023.  She happens to be the daughter of Barbara Samson, who was a patient of ours.    I saw Rosemarie for PVCs on 12/10/2020.  She had a history of ablation for PVCs approximately 20 years ago in [place].  At that time, she was very symptomatic with her PVCs and it markedly helped.    She had a seizure on 10/08/2020 and has been [place] by [place]. Gautam [facility].  She had a negative EEG and MRI and she was taken off Keppra.  She had another seizure, however, on 2020 and saw Dr. Gautam who placed her on Trileptal 300 mg in the morning and 450 mg in the evening.  She also has a history of hypertension, which has been followed by Ruth Mendes.  I recommended increasing Norvasc to 10 mg daily and a sleep study with a followup.  However, she did not follow up or we have not seen her since 12/10/2020.    She developed palpitations and was seen in the emergency room on 2023.  At that time, she was in atrial fibrillation which converted to sinus rhythm in the emergency room.  He recommended Eliquis, which she did not do.  She wanted to see me in followup.    Of note, when she came to the emergency room, she had imbibed a fair amount of alcohol.  Her sodium was 128 and her liver function tests were elevated with an ALT of 82 and AST of 187.    She does admit she has been under much stress.  She has been  from her  for seven years. He broke his hip and had to move back in with her as there was nobody else to help him. She took care of him for approximately six weeks and he is back now at home. This placed her under much stress. She does admit that she drank too much alcohol on 01/08 where she developed atrial fibrillation. She denies any chest pain or chest discomfort. She has had no PND or orthopnea. No pedal edema. No syncope or near syncope. She states that her heart was \"flipping in and out of rhythm\" for the prior two weeks, mainly at night. Her blood pressure has also been out of control. However, on the night of 01/08, she came home from work and had a mac 'n cheese, 10 minutes later started having \"racing heart. \"  It would not go away and at 9 p.m. came into the emergency room. Her enzymes were negative. She was discharged home. She has never had a myocardial infarction and never had a cardiac catheterization. She does have a history of intermittent hyponatremia and has seen Nephrology. No etiology was found. She last saw Dr. Joel Brown on 10/31/2022 and he felt that the sodium was most likely secondary to diarrhea. She continues to see Dr. Randy Villarreal who is treating her for partial idiopathic epilepsy, on Trileptal.  She has had no further seizures. CARDIAC RISK FACTORS:  Other Family Members:  Positive. Hypertension:  Positive. Hyperlipidemia:  Positive. Peripheral Vascular Disease:  Negative. Smoking:  Negative. Diabetes:  Negative. MEDICATIONS:  She is on Cozaar 100 mg daily, Lopressor 100 mg t.i.d., Trileptal 300 mg in the morning and 450 mg in the evening, Zanaflex 4 mg at bedtime, Ambien 10 mg p.r.n. PAST MEDICAL AND SURGICAL HISTORY:  1. She has a history of epilepsy, treated by Dr. Ganesh Verma. 2.  Hypertension, although her blood pressure has been elevated recently. 3.  History of intermittent hyponatremia. 4.   PVCs with ablation 20 years ago. 5.  Hysterectomy. 6.  Breast cancer history. 7.  Gastric bypass 15 years ago. 8.  Cholecystectomy. 9.  Appendectomy. FAMILY HISTORY:  Grandfather had coronary artery disease. Mother had coronary artery disease and atrial fibrillation. Both grandmothers had atrial fibrillation. SOCIAL HISTORY:  She is 61years old,  from her . Lives by herself with a Radha Golden Alex named Allen. She does drink alcohol occasionally and had drank a fair amount before she came to the emergency room on 01/08. She does have a snoring history, has sleep apnea, but she does not wear a CPAP mask. 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 Works with 4 to 5 nurses. Does not have any children. She quit smoking 18 years ago. REVIEW OF SYSTEMS:  Cardiac as above. Other systems reviewed including constitutional, eyes, ears, nose and throat, cardiovascular, respiratory, GI, , musculoskeletal, integumentary, neurologic, endocrine, hematologic and allergic/immunologic are negative except for what is described above. No weight loss or weight gain. No change in bowel habits. No blood in stools. No fevers, sweats or chills. PHYSICAL EXAMINATION:  VITAL SIGNS:  Her blood pressure was 240/120 with a heart rate of 89 and regular. Respiratory rate 18. O2 saturation was 95%. Weight 125 pounds. GENERAL:  She is a pleasant 50-year-old female. Denied pain. She was oriented to person, place and time. Answered questions appropriately. SKIN:  No unusual skin changes. HEENT:  The pupils are equally round and intact. Mucous membranes were dry. NECK:  No JVD. Good carotid pulses. No carotid bruits. No lymphadenopathy or thyromegaly. CARDIOVASCULAR EXAM:  S1 and S2 were normal.  No S3 or S4. Soft systolic blowing type murmur. No diastolic murmur. PMI was normal.  No lift, thrust, or pericardial friction rub. LUNGS:  Clear to auscultation and percussion.   ABDOMEN:  Soft and nontender. Good bowel sounds. EXTREMITIES:  Good femoral pulses. Good pedal pulses. No pedal edema. Skin was warm and dry. No calf tenderness. Nail beds pink. Good cap refill. PULSES:  Bilateral symmetrical radial, brachial and carotid pulses. No carotid bruits. Good femoral and pedal pulses. NEUROLOGIC EXAM:  Within normal limits. PSYCHIATRIC EXAM:  Within normal limits. LABORATORY DATA:  In the emergency room, her sodium was 128, potassium was 3.9, BUN 9, creatinine 0.5. Calcium was 9.2. Cholesterol 230 with an HDL of 101, LDL 79, triglycerides 251. ALT was 82, AST was 187. TSH of 2.07. White count 4.7 with a hemoglobin of 15.4 and a platelet count of 783,018. Laboratory data on 01/11 showed a sodium of 138, up from 128, with a potassium of 4.1, creatinine 0.67. ALT was 47, AST was 36. EKG on 01/08 showed atrial fibrillation with RVR with nonspecific ST changes. Repeat EKG on 01/11/2023, was essentially normal.    IMPRESSION:  1. Paroxysmal atrial fibrillation which converted to sinus rhythm in the emergency room on 01/08/2023.  2.  CHADS score 2 being female and hypertensive. 3.  History of hyponatremia, with her being hyponatremic when she came to the emergency room where her sodium was 128 with elevated liver function tests secondary to alcohol. 4.  History of seizures, which have been well controlled on Keppra. PLAN:  1. Add Hygroton 25 mg daily. 2.  Add Cardura one nightly. 3.  BMP in one week. 4.  Monitor blood pressure and heart rate daily at different times of the day and call us back in two weeks. 5.  Add Eliquis 5 mg b.i.d.  6.  We will follow up in six weeks. 7.  She will check the price of the NOACs. DISCUSSION:  Rosemarie was seen in the emergency room for atrial fibrillation. She has complained of palpitations for the last at least two weeks. Her CHADS score is 2 being female and hypertensive. Therefore, I would recommend anticoagulation.   We will give her samples of Eliquis and she will check the price of the NOACs. Her blood pressure is very elevated in our office at 240/120. She is on the max dose of beta-blocker and lisinopril. Therefore, we will add Hygroton and Cardura 2 mg nightly. She will call us in two weeks with her blood pressure and heart rate and we will increase as needed to control her pressure. She does have a history of hyponatremia. In the emergency room, she was also markedly hyponatremic with a sodium of 128. However, she had been drinking that night as manifested also by her elevated liver function tests. Therefore, her former episodes of hyponatremia might have been alcohol induced. We will do a 30-day event recorder to see if she continues to have intermittent episodes of atrial fibrillation. She does not complain of chest pain or chest discomfort, therefore we will not do a stress test at this time. Thank you very much for allowing me the privilege of seeing Rosemarie. If you have any questions on my thoughts, please do not hesitate to contact me.         Josue Negro MD    D: 01/15/2023 21:08:00     T: 01/15/2023 21:14:41     [email or web address]  Job#: [id number]   Doc#: [id number]

## 2023-01-31 ENCOUNTER — HOSPITAL ENCOUNTER (OUTPATIENT)
Age: 58
Discharge: HOME | End: 2023-01-31
Payer: COMMERCIAL

## 2023-01-31 DIAGNOSIS — R06.02 SOB (SHORTNESS OF BREATH): ICD-10-CM

## 2023-01-31 DIAGNOSIS — I48.91 ATRIAL FIBRILLATION, UNSPECIFIED TYPE (HCC): ICD-10-CM

## 2023-01-31 DIAGNOSIS — I10 HYPERTENSION, UNSPECIFIED TYPE: ICD-10-CM

## 2023-01-31 LAB
ANION GAP SERPL CALCULATED.3IONS-SCNC: 13 MMOL/L (ref 9–17)
BUN BLDV-MCNC: 13 MG/DL (ref 6–20)
BUN/CREAT BLD: 22 (ref 9–20)
CALCIUM SERPL-MCNC: 9.2 MG/DL (ref 8.6–10.4)
CHLORIDE BLD-SCNC: 91 MMOL/L (ref 98–107)
CO2: 27 MMOL/L (ref 20–31)
CREAT SERPL-MCNC: 0.6 MG/DL (ref 0.5–0.9)
GFR SERPL CREATININE-BSD FRML MDRD: >60 ML/MIN/1.73M2
GLUCOSE BLD-MCNC: 101 MG/DL (ref 70–99)
POTASSIUM SERPL-SCNC: 3.6 MMOL/L (ref 3.7–5.3)
SODIUM BLD-SCNC: 131 MMOL/L (ref 135–144)

## 2023-01-31 PROCEDURE — 36415 COLL VENOUS BLD VENIPUNCTURE: CPT

## 2023-01-31 PROCEDURE — 80048 BASIC METABOLIC PNL TOTAL CA: CPT

## 2023-02-01 ENCOUNTER — TELEPHONE (OUTPATIENT)
Dept: CARDIOLOGY CLINIC | Age: 58
End: 2023-02-01

## 2023-02-01 NOTE — TELEPHONE ENCOUNTER
Spoke to pt - advised Sodium down a bit   Dr. Lula Dickinson suggest Sodium tablets bid   Pt stated she saw neurologist yesterday and   They are wanting her to try a new med   That will not lower sodium like her trileptal   Does. It will be a long process switching over   Per pt.

## 2023-02-14 ENCOUNTER — HOSPITAL ENCOUNTER (OUTPATIENT)
Dept: NON INVASIVE DIAGNOSTICS | Age: 58
Discharge: HOME | End: 2023-02-14
Payer: COMMERCIAL

## 2023-02-14 DIAGNOSIS — R06.02 SOB (SHORTNESS OF BREATH): ICD-10-CM

## 2023-02-14 LAB
LV EF: 65 %
LVEF MODALITY: NORMAL

## 2023-02-14 PROCEDURE — 93306 TTE W/DOPPLER COMPLETE: CPT

## 2023-02-22 ENCOUNTER — OFFICE VISIT (OUTPATIENT)
Dept: CARDIOLOGY CLINIC | Age: 58
End: 2023-02-22
Payer: COMMERCIAL

## 2023-02-22 VITALS
DIASTOLIC BLOOD PRESSURE: 60 MMHG | OXYGEN SATURATION: 98 % | SYSTOLIC BLOOD PRESSURE: 120 MMHG | HEART RATE: 70 BPM | BODY MASS INDEX: 25 KG/M2 | WEIGHT: 128 LBS

## 2023-02-22 DIAGNOSIS — R06.02 SOB (SHORTNESS OF BREATH): Primary | ICD-10-CM

## 2023-02-22 DIAGNOSIS — I10 HYPERTENSION, UNSPECIFIED TYPE: ICD-10-CM

## 2023-02-22 DIAGNOSIS — I48.91 ATRIAL FIBRILLATION, UNSPECIFIED TYPE (HCC): ICD-10-CM

## 2023-02-22 PROCEDURE — 99214 OFFICE O/P EST MOD 30 MIN: CPT | Performed by: INTERNAL MEDICINE

## 2023-02-22 RX ORDER — DILTIAZEM HYDROCHLORIDE 60 MG/1
60 TABLET, FILM COATED ORAL 2 TIMES DAILY
Qty: 60 TABLET | Refills: 11 | Status: SHIPPED | OUTPATIENT
Start: 2023-02-22

## 2023-02-22 RX ORDER — CENOBAMATE 12.5-25MG
KIT ORAL
COMMUNITY
Start: 2023-02-01

## 2023-02-22 NOTE — PROGRESS NOTES
Ov DR Janell Gonzalez follow up   Bp at home in good   Occ palpitations  No chest pain or sob. Felt had one episode a fib  Didn't last long. They are switching seizure  Med over to a new med   That will not cause sodium  To drop. Encouraged to get kardia device   Will start diltiazem 60 mg bid.     See in 6 weeks with cbc cmp

## 2023-02-27 NOTE — PROGRESS NOTES
Viv Mercado MD  [address] [facility]  [address] [place], [place] [address]  [phone]      2023      Ruth Mendes, APRN-CNP  [address] [place] [address], [address] [facility]      RE:   Liban Pereyra  :  1965      Dear Bess Adams:    CHIEF COMPLAINT:  1. Atrial fibrillation. 2.  Abnormal 30-day event recorder with atrial flutter and atrial fibrillation identified. 3. \"Rapid heartbeat,\" usually associated with mild sinus tachycardia on the event recorder. HISTORY OF PRESENT ILLNESS:  I met with Rosemarie Siu in our office on 2023. I trust you received my full H and P from 2023. As you know, she had had atrial fibrillation on , was converted to sinus rhythm in the emergency room. She has a history of palpitations. I placed an event recorder, and we brought her back today for reevaluation. We had multiple strips on the event recorder. Most of her symptoms of \"heart racing\" were associated with sinus tachycardia at 95 to 110 beats per minute. However, she did have four episodes of atrial fibrillation/atrial flutter on the event recorder. She would then switch back to sinus rhythm. She was for the most part asymptomatic and did not put any symptoms down except for one episode of atrial fibrillation. She denies today any chest pain or chest discomfort. She states that she is being weaned off her Trileptal and switching to [place], which will be increased over the next 6 months. She overall feels good. Denies any chest pain or any unusual shortness of breath. MEDICATIONS:  Her medications are Eliquis 5 mg b.i.d., Hygroton 25 mg daily, Cardura 2 mg daily, Cozaar 100 mg daily, Lopressor 100 mg t.i.d., Trileptal 300 mg in the morning and 450 mg in the evening, Zanaflex 4 mg at bedtime, Xcopri, Ambien 10 mg p.r.n.     PHYSICAL EXAMINATION:   VITAL SIGNS:  Her blood pressure was 120/70 with a heart rate of 60 and regular. Respirations were 18. Her exam was unremarkable. I went over her event recorder and showed her the sinus rhythm and her sinus tachycardia associated with \"rapid heartbeat. \"  I also showed her atrial fibrillation. IMPRESSION:  1. Atrial fibrillation, identified on her 30-day event recorder. 2.  Hypertension, well controlled. PLAN:  1. We will place her on Cardizem 60 mg b.i.d.  2.  She will take her blood pressure and heart rate daily and if her blood pressure begins to drop, we will stop the Cardura and leave her on the Cardizem. 3.  No indication for any antiarrhythmic at this time as she is asymptomatic and is protected with the Eliquis. DISCUSSION:  Rosemarie overall is doing well. However, her event recorder clearly showed that she had mainly atrial fibrillation, but one episode of atrial flutter with 2:1 conduction. She already has a CHADS score of 2, being hypertensive and female. However, certainly, of course with her atrial fibrillation identified on the 30-day event recorder, she will be on anticoagulation indefinitely. I will add Cardizem to see if we can decrease the frequency of atrial fibrillation, although it is asymptomatic and therefore, I do not expect that we will have a good idea of the frequency. I did have her get a Kardia device, which she will use if she feels any \"rapid heartbeat or palpitations. \"  She can send her readings from her Kardia device to our office where I can see them. I will plan on seeing her in 6 weeks for repeat evaluation with also a CBC and a complete metabolic profile. Thank you very much for allowing me the privilege of seeing Rosemarie. If you have any questions on my thoughts, please do not hesitate to contact me.      Sincerely,        Dany Swenson MD    D: 02/22/2023 9:34:18     T: 02/22/2023 9:37:05     [email or web address]  Job#: [id number]   Doc#: [id number]

## 2023-04-04 ENCOUNTER — HOSPITAL ENCOUNTER (OUTPATIENT)
Age: 58
Discharge: HOME | End: 2023-04-04
Payer: COMMERCIAL

## 2023-04-04 DIAGNOSIS — I48.91 ATRIAL FIBRILLATION, UNSPECIFIED TYPE (HCC): ICD-10-CM

## 2023-04-04 DIAGNOSIS — I10 HYPERTENSION, UNSPECIFIED TYPE: ICD-10-CM

## 2023-04-04 DIAGNOSIS — R06.02 SOB (SHORTNESS OF BREATH): ICD-10-CM

## 2023-04-04 LAB
ABSOLUTE EOS #: 0 K/UL (ref 0–0.4)
ABSOLUTE LYMPH #: 1.1 K/UL (ref 1–4.8)
ABSOLUTE MONO #: 0.8 K/UL (ref 0–1)
ALBUMIN SERPL-MCNC: 4.2 G/DL (ref 3.5–5.2)
ALP SERPL-CCNC: 64 U/L (ref 35–104)
ALT SERPL-CCNC: 37 U/L (ref 5–33)
ANION GAP SERPL CALCULATED.3IONS-SCNC: 9 MMOL/L (ref 9–17)
AST SERPL-CCNC: 31 U/L
BASOPHILS # BLD: 1 % (ref 0–2)
BASOPHILS ABSOLUTE: 0 K/UL (ref 0–0.2)
BILIRUB SERPL-MCNC: 0.3 MG/DL (ref 0.3–1.2)
BUN SERPL-MCNC: 16 MG/DL (ref 6–20)
BUN/CREAT BLD: 24 (ref 9–20)
CALCIUM SERPL-MCNC: 9.4 MG/DL (ref 8.6–10.4)
CHLORIDE SERPL-SCNC: 97 MMOL/L (ref 98–107)
CO2 SERPL-SCNC: 29 MMOL/L (ref 20–31)
CREAT SERPL-MCNC: 0.68 MG/DL (ref 0.5–0.9)
DIFFERENTIAL TYPE: YES
EOSINOPHILS RELATIVE PERCENT: 1 % (ref 0–5)
GFR SERPL CREATININE-BSD FRML MDRD: >60 ML/MIN/1.73M2
GLUCOSE SERPL-MCNC: 95 MG/DL (ref 70–99)
HCT VFR BLD AUTO: 39.2 % (ref 36–46)
HGB BLD-MCNC: 13.4 G/DL (ref 12–16)
LYMPHOCYTES # BLD: 20 % (ref 15–40)
MCH RBC QN AUTO: 31.7 PG (ref 26–34)
MCHC RBC AUTO-ENTMCNC: 34.3 G/DL (ref 31–37)
MCV RBC AUTO: 92.5 FL (ref 80–100)
MONOCYTES # BLD: 14 % (ref 4–8)
PDW BLD-RTO: 14.8 % (ref 12.1–15.2)
PLATELET # BLD AUTO: 256 K/UL (ref 140–450)
POTASSIUM SERPL-SCNC: 3.7 MMOL/L (ref 3.7–5.3)
PROT SERPL-MCNC: 6.5 G/DL (ref 6.4–8.3)
RBC # BLD: 4.24 M/UL (ref 4–5.2)
SEG NEUTROPHILS: 64 % (ref 47–75)
SEGMENTED NEUTROPHILS ABSOLUTE COUNT: 3.5 K/UL (ref 2.5–7)
SODIUM SERPL-SCNC: 135 MMOL/L (ref 135–144)
WBC # BLD AUTO: 5.4 K/UL (ref 3.5–11)

## 2023-04-04 PROCEDURE — 80053 COMPREHEN METABOLIC PANEL: CPT

## 2023-04-04 PROCEDURE — 85025 COMPLETE CBC W/AUTO DIFF WBC: CPT

## 2023-04-04 PROCEDURE — 36415 COLL VENOUS BLD VENIPUNCTURE: CPT

## 2023-12-27 DIAGNOSIS — Z13.6 ENCOUNTER FOR LIPID SCREENING FOR CARDIOVASCULAR DISEASE: ICD-10-CM

## 2023-12-27 DIAGNOSIS — I10 HYPERTENSION, UNSPECIFIED TYPE: ICD-10-CM

## 2023-12-27 DIAGNOSIS — I48.91 ATRIAL FIBRILLATION, UNSPECIFIED TYPE (HCC): ICD-10-CM

## 2023-12-27 DIAGNOSIS — R06.02 SOB (SHORTNESS OF BREATH): Primary | ICD-10-CM

## 2023-12-27 DIAGNOSIS — Z13.220 ENCOUNTER FOR LIPID SCREENING FOR CARDIOVASCULAR DISEASE: ICD-10-CM

## 2023-12-27 DIAGNOSIS — E55.9 VITAMIN D DEFICIENCY: ICD-10-CM

## 2023-12-27 RX ORDER — DILTIAZEM HYDROCHLORIDE 60 MG/1
60 TABLET, FILM COATED ORAL
Qty: 180 TABLET | Refills: 3 | Status: SHIPPED | OUTPATIENT
Start: 2023-12-27

## 2023-12-27 RX ORDER — DOXAZOSIN 2 MG/1
2 TABLET ORAL NIGHTLY
Qty: 90 TABLET | Refills: 3 | Status: SHIPPED | OUTPATIENT
Start: 2023-12-27

## 2023-12-27 RX ORDER — CHLORTHALIDONE 25 MG/1
25 TABLET ORAL DAILY
Qty: 90 TABLET | Refills: 3 | Status: SHIPPED | OUTPATIENT
Start: 2023-12-27

## 2024-01-23 ENCOUNTER — OFFICE VISIT (OUTPATIENT)
Dept: CARDIOLOGY CLINIC | Age: 59
End: 2024-01-23

## 2024-01-23 DIAGNOSIS — I10 HYPERTENSION, UNSPECIFIED TYPE: ICD-10-CM

## 2024-01-23 DIAGNOSIS — I48.91 ATRIAL FIBRILLATION, UNSPECIFIED TYPE (HCC): ICD-10-CM

## 2024-01-23 DIAGNOSIS — R06.02 SOB (SHORTNESS OF BREATH): Primary | ICD-10-CM

## 2024-01-23 RX ORDER — TOLVAPTAN 15 MG/1
TABLET ORAL
COMMUNITY
Start: 2024-01-04

## 2024-01-23 RX ORDER — POTASSIUM CHLORIDE 750 MG/1
10 CAPSULE, EXTENDED RELEASE ORAL DAILY
COMMUNITY
Start: 2024-01-19

## 2024-01-23 RX ORDER — ONDANSETRON 4 MG/1
4 TABLET, ORALLY DISINTEGRATING ORAL EVERY 8 HOURS PRN
COMMUNITY
Start: 2024-01-05

## 2024-01-23 RX ORDER — CLONAZEPAM 0.12 MG/1
TABLET, ORALLY DISINTEGRATING ORAL
COMMUNITY
Start: 2023-12-27

## 2024-01-23 RX ORDER — LAMOTRIGINE 25 MG/1
50 TABLET ORAL 2 TIMES DAILY
COMMUNITY
Start: 2023-12-27

## 2024-01-23 RX ORDER — TRAMADOL HYDROCHLORIDE 50 MG/1
TABLET ORAL
COMMUNITY
Start: 2023-12-30

## 2024-01-23 NOTE — PROGRESS NOTES
Ov Dr. Morfin for 6 month f/u   Hospital follow up Dec/Jan   For seizures - SIadh   No chest pain   No palpations   Unable to drive x 3 months       No changes    Follow up in one year

## 2024-12-26 RX ORDER — DOXAZOSIN 2 MG/1
2 TABLET ORAL NIGHTLY
Qty: 90 TABLET | Refills: 3 | Status: SHIPPED | OUTPATIENT
Start: 2024-12-26

## 2025-07-07 DIAGNOSIS — I10 HYPERTENSION, UNSPECIFIED TYPE: ICD-10-CM

## 2025-07-07 DIAGNOSIS — Z13.6 ENCOUNTER FOR LIPID SCREENING FOR CARDIOVASCULAR DISEASE: ICD-10-CM

## 2025-07-07 DIAGNOSIS — Z13.220 ENCOUNTER FOR LIPID SCREENING FOR CARDIOVASCULAR DISEASE: ICD-10-CM

## 2025-07-07 DIAGNOSIS — R06.02 SOB (SHORTNESS OF BREATH): Primary | ICD-10-CM

## 2025-07-07 DIAGNOSIS — E55.9 VITAMIN D DEFICIENCY: ICD-10-CM

## 2025-07-07 DIAGNOSIS — I48.91 ATRIAL FIBRILLATION, UNSPECIFIED TYPE (HCC): ICD-10-CM

## 2025-07-07 RX ORDER — DOXAZOSIN 2 MG/1
2 TABLET ORAL NIGHTLY
Qty: 90 TABLET | Refills: 3 | Status: SHIPPED | OUTPATIENT
Start: 2025-07-07